# Patient Record
Sex: FEMALE | Race: OTHER | URBAN - NONMETROPOLITAN AREA
[De-identification: names, ages, dates, MRNs, and addresses within clinical notes are randomized per-mention and may not be internally consistent; named-entity substitution may affect disease eponyms.]

---

## 2024-10-04 ENCOUNTER — OFFICE VISIT (OUTPATIENT)
Dept: OBGYN CLINIC | Facility: CLINIC | Age: 29
End: 2024-10-04

## 2024-10-04 VITALS
WEIGHT: 174 LBS | TEMPERATURE: 98 F | SYSTOLIC BLOOD PRESSURE: 96 MMHG | HEIGHT: 60 IN | HEART RATE: 67 BPM | OXYGEN SATURATION: 99 % | BODY MASS INDEX: 34.16 KG/M2 | DIASTOLIC BLOOD PRESSURE: 68 MMHG
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
## 2024-10-04 PROCEDURE — 99203 OFFICE O/P NEW LOW 30 MIN: CPT | Performed by: STUDENT IN AN ORGANIZED HEALTH CARE EDUCATION/TRAINING PROGRAM

## 2024-10-04 RX ORDER — ESCITALOPRAM OXALATE 10 MG/1
TABLET ORAL
COMMUNITY
Start: 2024-09-04

## 2024-10-04 RX ORDER — CHOLECALCIFEROL (VITAMIN D3) 50 MCG
2000 TABLET ORAL DAILY
COMMUNITY
Start: 2024-09-11

## 2024-10-04 NOTE — PROGRESS NOTES
ASSESSMENT/PLAN:    Assessment:   The patient is over a week out from a cat bite to the right hand.  I was concerned when I heard that she did not take her prescribed antibiotics however on examination there is no evidence of infection.  I also checked proximally in the lymph nodes for potential cat scratch fever but the lymph nodes were not palpable.  As such I think the patient has likely avoided infection in this case.  The wounds also appear relatively superficial and are healing well.  There is no functional deficit related to any of these injuries.  As such she can be continued on nonoperative management which is just targeted at soft tissue healing.  The wounds at this point appear to be healing over well and are dry and I believe need no covering.  After another week if she could not is concerned about the scar she can start using a lotion to rub the scar.  We did discuss that there is always the possibility of a latent infection and she should monitor for these for signs of infection.    Plan:  Monitor for possible signs of infection    Follow-Up:  As needed    _____________________________________________________  CHIEF COMPLAINT:  Chief Complaint   Patient presents with    Right Hand - Follow-up     Possible ligament damage         SUBJECTIVE:  Shilpi Shepherd is a 29 y.o. right hand dominant female who presents with right hand pain.  The patient first noted symptoms 9/25/2024. She sustained a cat bite and scratches. She went to urgent care in [place] on 9/27/2024 for evaluation. She received xrays, a splint, and antibiotics. She notes she did not take the antibiotics. She states she only wore the splint for a few days. She denies numbness and tingling.  She denies pain.    Previous treatments Splint for a few days    Occupation: [employer]      PAST MEDICAL HISTORY:  History reviewed. No pertinent past medical history.    PAST SURGICAL HISTORY:  History reviewed. No pertinent surgical history.    FAMILY  HISTORY:  History reviewed. No pertinent family history.    SOCIAL HISTORY:  Social History     Tobacco Use    Smoking status: Never    Smokeless tobacco: Never   Vaping Use    Vaping status: Never Used   Substance Use Topics    Alcohol use: Yes     Comment: socially    Drug use: Yes     Types: Marijuana       MEDICATIONS:    Current Outpatient Medications:     Cholecalciferol (Vitamin D3) 50 MCG (2000 UT) TABS, Take 2,000 Units by mouth daily, Disp: , Rfl:     escitalopram (LEXAPRO) 10 mg tablet, , Disp: , Rfl:     ALLERGIES:  No Known Allergies    REVIEW OF SYSTEMS:  Pertinent items are noted in HPI.  A comprehensive review of systems was negative.    LABS:  HgA1c:   Lab Results   Component Value Date    HGBA1C 5.2 09/09/2024     BMP:   Lab Results   Component Value Date    CALCIUM 9.7 09/09/2024    K 4.2 09/09/2024    CO2 27.0 09/09/2024    BUN 9 09/09/2024    CREATININE 0.64 09/09/2024         _____________________________________________________  PHYSICAL EXAMINATION:  Vital signs: BP 96/68 (BP Location: Left arm, Patient Position: Sitting, Cuff Size: Standard)   Pulse 67   Temp 98 °F (36.7 °C) (Temporal)   Ht 5' (1.524 m)   Wt 78.9 kg (174 lb)   SpO2 99%   BMI 33.98 kg/m²   General: well developed and well nourished, alert, oriented times 3, and appears comfortable  Psychiatric: Normal  HEENT: Trachea Midline, No torticollis  Cardiovascular: No discernable arrhythmia  Pulmonary: No wheezing or stridor  Abdomen: No rebound or guarding  Extremities: No peripheral edema  Skin: No masses, erythema, lacerations, fluctation, ulcerations  Neurovascular: Sensation Intact to the Median, Ulnar, Radial Nerve, Motor Intact to the Median, Ulnar, Radial Nerve, and Pulses Intact    MUSCULOSKELETAL EXAMINATION:  Right hand  Patient presents with no obvious anatomical deformity  Skin is warm and dry to touch with no signs of erythema or infection.  There is a small punctate wound at the ulnar base of the small finger.   There is no erythema about this and it is minimally tender to palpation.  It is just near the A1 pulley which itself is not tender to palpation.  The flexor sheath has no tenderness to palpation and does not swollen.  She is able to make a full composite fist without pain.  Sensation throughout all fingers including the small finger are full with intact 2 point which is 4 mm.  She also has superficial abrasions on the forearm from the cat scratching her.  I checked proximal to this for possible signs of cat scratch fever but found no proximal tracking signs and no fullness of the lymph nodes in her axilla.  Range of motion: within normal limits  Strength: 5/5 ABP, 5/5 FPL, 5/5 FDIOS, 5/5 FDSV, 5/5 EPL  There is no muscle atrophy of the thenar or hypothenar  FDS intact   FDP intact   FPL intact   wrist and finger extensors intact bilaterally  Wrist, elbow, and shoulder motion within normal limits  Forearm compartments are soft and supple  2+ Distal radial pulse with brisk capillary refill to the fingers  Sensation to light touch grossly intact in the median, radial and ulnar nerve distributions    _____________________________________________________  STUDIES REVIEWED:  I reviewed imaging in PACS from 9/27/2024 of the right hand which demonstrates no evidence of fracture or dislocation.  There is no evidence of foreign body.      PROCEDURES PERFORMED:  Procedures None performed at today's visit      Scribe Attestation      I,:  Monalisa Rothman am acting as a scribe while in the presence of the attending physician.:       I,:  Abdelrahman Nunez MD personally performed the services described in this documentation    as scribed in my presence.: